# Patient Record
Sex: MALE | Race: WHITE | NOT HISPANIC OR LATINO | Employment: FULL TIME | URBAN - METROPOLITAN AREA
[De-identification: names, ages, dates, MRNs, and addresses within clinical notes are randomized per-mention and may not be internally consistent; named-entity substitution may affect disease eponyms.]

---

## 2018-01-04 ENCOUNTER — TRANSCRIBE ORDERS (OUTPATIENT)
Dept: PHYSICAL THERAPY | Facility: CLINIC | Age: 38
End: 2018-01-04

## 2018-01-04 DIAGNOSIS — S39.012A BACK STRAIN, INITIAL ENCOUNTER: Primary | ICD-10-CM

## 2018-01-09 ENCOUNTER — TREATMENT (OUTPATIENT)
Dept: PHYSICAL THERAPY | Facility: CLINIC | Age: 38
End: 2018-01-09

## 2018-01-09 DIAGNOSIS — S39.012A BACK STRAIN, INITIAL ENCOUNTER: ICD-10-CM

## 2018-01-09 PROCEDURE — 97110 THERAPEUTIC EXERCISES: CPT | Performed by: PHYSICAL THERAPIST

## 2018-01-09 PROCEDURE — 97001 PR PHYS THERAPY EVALUATION: CPT | Performed by: PHYSICAL THERAPIST

## 2018-01-09 NOTE — PROGRESS NOTES
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    Denies previous injury  Has improved somewhat    Pain with: donning socks, prolonged positions, bending forward    Medications: flexeril, ibuprofen  Used ice the first two days      Patient Occupation: [employer]  Pain  Current pain rating: 3  At best pain ratin  At worst pain ratin  Quality: tight, sharp and dull ache  Relieving factors: medications, rest, change in position and ice  Aggravating factors: movement, prolonged positioning, standing and lifting  Progression: improved    Patient Goals  Patient goals for therapy: decreased pain, increased motion, return to work and independence with ADLs/IADLs             Objective       Postural Observations  Seated posture: fair  Standing posture: fair        Palpation   Left   No palpable tenderness to the erector spinae, lumbar paraspinals and quadratus lumborum.     Right   No palpable tenderness to the erector spinae, lumbar paraspinals and quadratus lumborum.     Tenderness     Lumbar Spine  No tenderness in the spinous process and facet joint.     Left Hip   No tenderness in the PSIS.     Right Hip   No tenderness in the PSIS.     Neurological Testing     Sensation     Lumbar   Left   Intact: light touch    Right   Intact: light touch    Reflexes   Left   Patellar (L4): normal (2+)  Achilles (S1): normal (2+)    Right   Patellar (L4): normal (2+)  Achilles (S1): normal (2+)    Active Range of Motion     Lumbar   Flexion: WFL  Extension: WFL  Left lateral flexion: WFL and with pain  Right lateral flexion: WFL  Left rotation: 60 degrees   Right rotation: 60 degrees      Strength/Myotome Testing     Lumbar   Left   Normal strength    Right   Normal strength    Tests     Lumbar     Left   Negative crossed SLR and passive SLR.     Right   Negative crossed SLR and passive SLR.     Left Pelvic Girdle/Sacrum   Negative: sacrum compression, gapping and sacral spring.     Right Pelvic Girdle/Sacrum   Negative: sacrum compression, gapping and sacral spring.          Assessment & Plan     Assessment  Impairments: abnormal or restricted ROM, activity intolerance, lacks appropriate home exercise program and pain with function  Assessment details: S/S consistent with lumbar strain after lifting/pulling a heavy appliance at work; per patient report the pain and function have improved substantially since the injury but he continues to have some discomfort and difficulty performing ADL's and work tasks; needs PT for modalities, manual therapy, therapeutic exercise in order to RTW on full duty without pain or dysfunction  Functional Limitations: carrying objects, lifting, walking, pulling, uncomfortable because of pain, sitting, standing and stooping  Goals  Plan Goals: 3 weeks:  1. IND with HEP  2. Full pain-free ROM of lumbar spine  3. Able to walk, sit, stand functional lengths of time in order to perform work duties  4. Able to lift, carry, push and pull up to 50# in order to RTW on full duty    Plan  Therapy options: will be seen for skilled physical therapy services  Planned modality interventions: iontophoresis, TENS, thermotherapy (hydrocollator packs), ultrasound, high voltage pulsed current (pain management), electrical stimulation/Russian stimulation and cryotherapy  Planned therapy interventions: body mechanics training, flexibility, functional ROM exercises, home exercise program, joint mobilization, manual therapy, neuromuscular re-education, soft tissue mobilization, spinal/joint mobilization, strengthening, stretching and therapeutic activities  Frequency: 2x week  Duration in  weeks: 3  Treatment plan discussed with: patient        Manual Therapy:         mins  22746;  Therapeutic Exercise:    45     mins  98316;     Neuromuscular Ac:        mins  18749;    Therapeutic Activity:          mins  75905;     Gait Training:           mins  18621;     Ultrasound:          mins  36782;    Electrical Stimulation:         mins  31930 ( );  Dry Needling          mins self-pay    Timed Treatment:   45   mins   Total Treatment:     90   mins    PT SIGNATURE: Adam Gardner, PT   DATE TREATMENT INITIATED: 1/9/2018    Initial Certification  Certification Period: 4/9/2018  I certify that the therapy services are furnished while this patient is under my care.  The services outlined above are required by this patient, and will be reviewed every 90 days.     PHYSICIAN: Jose Martins MD      DATE:     Please sign and return via fax to  .. Thank you, [facility].

## 2018-01-12 ENCOUNTER — TREATMENT (OUTPATIENT)
Dept: PHYSICAL THERAPY | Facility: CLINIC | Age: 38
End: 2018-01-12

## 2018-01-12 DIAGNOSIS — S39.012A BACK STRAIN, INITIAL ENCOUNTER: Primary | ICD-10-CM

## 2018-01-12 PROCEDURE — 97014 ELECTRIC STIMULATION THERAPY: CPT | Performed by: PHYSICAL THERAPIST

## 2018-01-12 PROCEDURE — 97110 THERAPEUTIC EXERCISES: CPT | Performed by: PHYSICAL THERAPIST

## 2018-01-12 NOTE — PROGRESS NOTES
Physical Therapy Daily Progress Note    TOTAL TIME: 65 MINUTES    Toy Lance reports: back continues to feel a little sore; overall improved      Objective   See Exercise, Manual, and Modality Logs for complete treatment.     THERAPEUTIC EXERCISES/ACTIVITIES ADDED TODAY: prone press ups, extension in standing    Assessment/Plan  PATIENT NEEDS CONTINUED PHYSICAL THERAPY IN ORDER TO ACHIEVE FULL ROM, STRENGTH AND FUNCTION WITH NO REPORTS OF PAIN IN ORDER TO RTW WITHOUT RESTRICTIONS AND WITHOUT PAIN OR DYSFUNCTION       Progress per Plan of Care and Progress strengthening /stabilization /functional activity           Manual Therapy:         mins  04430;  Therapeutic Exercise:    45     mins  08929;     Neuromuscular Ac:        mins  03904;    Therapeutic Activity:          mins  17659;     Gait Training:           mins  34086;     Ultrasound:          mins  69631;    Electrical Stimulation:    20     mins  05784 ( );  Dry Needling          mins self-pay    Timed Treatment:   45   mins   Total Treatment:     65   mins    Adam Gardner PT  Physical Therapist

## 2018-01-15 ENCOUNTER — TREATMENT (OUTPATIENT)
Dept: PHYSICAL THERAPY | Facility: CLINIC | Age: 38
End: 2018-01-15

## 2018-01-15 DIAGNOSIS — S39.012A BACK STRAIN, INITIAL ENCOUNTER: Primary | ICD-10-CM

## 2018-01-15 PROCEDURE — 97110 THERAPEUTIC EXERCISES: CPT | Performed by: PHYSICAL THERAPIST

## 2018-01-15 NOTE — PROGRESS NOTES
Physical Therapy Daily Progress Note    TOTAL TIME: 75 MINUTES    Toy Lucas reports: back is feeling a lot better, mainly hurts if I sit for too long      Objective   See Exercise, Manual, and Modality Logs for complete treatment.     THERAPEUTIC EXERCISES/ACTIVITIES ADDED TODAY: see flow sheets    Assessment/Plan  PATIENT NEEDS CONTINUED PHYSICAL THERAPY IN ORDER TO ACHIEVE FULL ROM, STRENGTH AND FUNCTION WITH NO REPORTS OF PAIN IN ORDER TO RTW WITHOUT RESTRICTIONS AND WITHOUT PAIN OR DYSFUNCTION       Progress per Plan of Care and Progress strengthening /stabilization /functional activity           Manual Therapy:         mins  43815;  Therapeutic Exercise:    75     mins  49022;     Neuromuscular Ac:        mins  75076;    Therapeutic Activity:          mins  07208;     Gait Training:           mins  29157;     Ultrasound:          mins  68358;    Electrical Stimulation:         mins  68332 ( );  Dry Needling          mins self-pay    Timed Treatment:   75   mins   Total Treatment:     75   mins    Adam Gardner PT  Physical Therapist

## 2018-01-22 ENCOUNTER — TREATMENT (OUTPATIENT)
Dept: PHYSICAL THERAPY | Facility: CLINIC | Age: 38
End: 2018-01-22

## 2018-01-22 DIAGNOSIS — S39.012A BACK STRAIN, INITIAL ENCOUNTER: Primary | ICD-10-CM

## 2018-01-22 PROCEDURE — 97014 ELECTRIC STIMULATION THERAPY: CPT | Performed by: PHYSICAL THERAPIST

## 2018-01-22 PROCEDURE — 97110 THERAPEUTIC EXERCISES: CPT | Performed by: PHYSICAL THERAPIST

## 2018-01-22 NOTE — PROGRESS NOTES
Physical Therapy Daily Progress Note    TOTAL TIME: 90 MINUTES    Toy Lucas reports: CONTINUED MILD BACK PAIN ON LEFT SIDE; SOME POPPING WHEN LEFT LEG IS ADVANCING FORWARD WHEN WALKING SOMETIMES      Objective   See Exercise, Manual, and Modality Logs for complete treatment.     THERAPEUTIC EXERCISES/ACTIVITIES ADDED TODAY: SEATED BILATERAL ROWS ON UNIVERSAL MACHINE WITH 3 PLATES, PRONE ALT ARM/LEG SUPERMANS    Assessment/Plan  PATIENT NEEDS CONTINUED PHYSICAL THERAPY IN ORDER TO ACHIEVE FULL ROM, STRENGTH AND FUNCTION WITH NO REPORTS OF PAIN IN ORDER TO RTW WITHOUT RESTRICTIONS AND WITHOUT PAIN OR DYSFUNCTION       Progress per Plan of Care and Progress strengthening /stabilization /functional activity           Manual Therapy:    5     mins  10529;  Therapeutic Exercise:    65     mins  81260;     Neuromuscular Ac:        mins  08392;    Therapeutic Activity:          mins  72586;     Gait Training:           mins  90229;     Ultrasound:          mins  41070;    Electrical Stimulation:    20     mins  19948 ( );  Dry Needling          mins self-pay    Timed Treatment:   70   mins   Total Treatment:     90   mins    Adam Gardner, PT  Physical Therapist

## 2018-01-29 ENCOUNTER — TREATMENT (OUTPATIENT)
Dept: PHYSICAL THERAPY | Facility: CLINIC | Age: 38
End: 2018-01-29

## 2018-01-29 DIAGNOSIS — S39.012A BACK STRAIN, INITIAL ENCOUNTER: Primary | ICD-10-CM

## 2018-01-29 PROCEDURE — 97014 ELECTRIC STIMULATION THERAPY: CPT | Performed by: PHYSICAL THERAPIST

## 2018-01-29 PROCEDURE — 97110 THERAPEUTIC EXERCISES: CPT | Performed by: PHYSICAL THERAPIST

## 2018-01-29 NOTE — PROGRESS NOTES
Physical Therapy Daily Progress Note    TOTAL TIME: 60 MINUTES    Toy Lucas reports: back feeling better, walking more at work; still on weight restriction      Objective   See Exercise, Manual, and Modality Logs for complete treatment.     THERAPEUTIC EXERCISES/ACTIVITIES ADDED TODAY: n/a    Assessment/Plan  PATIENT NEEDS CONTINUED PHYSICAL THERAPY IN ORDER TO ACHIEVE FULL ROM, STRENGTH AND FUNCTION WITH NO REPORTS OF PAIN IN ORDER TO RTW WITHOUT RESTRICTIONS AND WITHOUT PAIN OR DYSFUNCTION       Progress per Plan of Care and Progress strengthening /stabilization /functional activity           Manual Therapy:         mins  09400;  Therapeutic Exercise:    40     mins  37070;     Neuromuscular Ac:        mins  56137;    Therapeutic Activity:          mins  97450;     Gait Training:           mins  34960;     Ultrasound:          mins  76705;    Electrical Stimulation:    20     mins  26184 ( );  Dry Needling          mins self-pay    Timed Treatment:   40   mins   Total Treatment:     60   mins    Adam Gardner PT  Physical Therapist

## 2018-02-14 ENCOUNTER — TREATMENT (OUTPATIENT)
Dept: PHYSICAL THERAPY | Facility: CLINIC | Age: 38
End: 2018-02-14

## 2018-02-14 DIAGNOSIS — S39.012A BACK STRAIN, INITIAL ENCOUNTER: Primary | ICD-10-CM

## 2018-02-14 PROCEDURE — 97110 THERAPEUTIC EXERCISES: CPT | Performed by: PHYSICAL THERAPIST

## 2018-02-14 PROCEDURE — 97014 ELECTRIC STIMULATION THERAPY: CPT | Performed by: PHYSICAL THERAPIST

## 2018-02-14 NOTE — PROGRESS NOTES
Physical Therapy Daily Progress Note    TOTAL TIME: 80 MINUTES    Toy Lucas reports: back continues to hurt intermittently, mostly with lifting or staying bent over too much      Objective   See Exercise, Manual, and Modality Logs for complete treatment.     THERAPEUTIC EXERCISES/ACTIVITIES ADDED TODAY: n/a    Assessment/Plan  PATIENT NEEDS CONTINUED PHYSICAL THERAPY IN ORDER TO ACHIEVE FULL ROM, STRENGTH AND FUNCTION WITH NO REPORTS OF PAIN IN ORDER TO RTW WITHOUT RESTRICTIONS AND WITHOUT PAIN OR DYSFUNCTION       Progress per Plan of Care and Progress strengthening /stabilization /functional activity           Manual Therapy:         mins  54887;  Therapeutic Exercise:    60     mins  45087;     Neuromuscular Ac:        mins  77137;    Therapeutic Activity:          mins  04647;     Gait Training:           mins  21245;     Ultrasound:          mins  82389;    Electrical Stimulation:    20     mins  91425 ( );  Dry Needling          mins self-pay    Timed Treatment:   60   mins   Total Treatment:     80   mins    Adam Gardner, PT  Physical Therapist

## 2024-07-15 ENCOUNTER — TRANSCRIBE ORDERS (OUTPATIENT)
Dept: GENERAL RADIOLOGY | Facility: HOSPITAL | Age: 44
End: 2024-07-15
Payer: COMMERCIAL

## 2024-07-15 DIAGNOSIS — M79.672 FOOT PAIN, LEFT: Primary | ICD-10-CM

## 2024-07-15 DIAGNOSIS — M79.672 LEFT FOOT PAIN: ICD-10-CM
